# Patient Record
Sex: FEMALE | Race: WHITE | NOT HISPANIC OR LATINO
[De-identification: names, ages, dates, MRNs, and addresses within clinical notes are randomized per-mention and may not be internally consistent; named-entity substitution may affect disease eponyms.]

---

## 2023-03-06 PROBLEM — Z00.00 ENCOUNTER FOR PREVENTIVE HEALTH EXAMINATION: Status: ACTIVE | Noted: 2023-03-06

## 2023-03-08 ENCOUNTER — APPOINTMENT (OUTPATIENT)
Dept: BREAST CENTER | Facility: CLINIC | Age: 18
End: 2023-03-08
Payer: MEDICAID

## 2023-03-08 ENCOUNTER — NON-APPOINTMENT (OUTPATIENT)
Age: 18
End: 2023-03-08

## 2023-03-08 VITALS
HEART RATE: 73 BPM | HEIGHT: 66 IN | WEIGHT: 168 LBS | DIASTOLIC BLOOD PRESSURE: 74 MMHG | SYSTOLIC BLOOD PRESSURE: 118 MMHG | BODY MASS INDEX: 27 KG/M2

## 2023-03-08 DIAGNOSIS — Z87.898 PERSONAL HISTORY OF OTHER SPECIFIED CONDITIONS: ICD-10-CM

## 2023-03-08 DIAGNOSIS — Z78.9 OTHER SPECIFIED HEALTH STATUS: ICD-10-CM

## 2023-03-08 DIAGNOSIS — Z80.3 FAMILY HISTORY OF MALIGNANT NEOPLASM OF BREAST: ICD-10-CM

## 2023-03-08 DIAGNOSIS — L73.9 FOLLICULAR DISORDER, UNSPECIFIED: ICD-10-CM

## 2023-03-08 DIAGNOSIS — N60.81 OTHER BENIGN MAMMARY DYSPLASIAS OF RIGHT BREAST: ICD-10-CM

## 2023-03-08 PROCEDURE — 99243 OFF/OP CNSLTJ NEW/EST LOW 30: CPT

## 2023-03-08 NOTE — ASSESSMENT
[FreeTextEntry1] : 16 yo female with right breast resolved folliculitis and sebaceous cyst and folliculitis left breast\par recommend warm compresses prn\par recommend dermatology evaluation\par I do not recommend follow up imaging at this time\par She can see me prn as she is clinically improving, \par discussed skin hygiene and dermatology follow up\par \par She knows to call or return sooner should any concerns or questions arise.\par

## 2023-03-08 NOTE — PHYSICAL EXAM
[Normocephalic] : normocephalic [Atraumatic] : atraumatic [Supple] : supple [No Supraclavicular Adenopathy] : no supraclavicular adenopathy [Examined in the supine and seated position] : examined in the supine and seated position [Symmetrical] : symmetrical [No dominant masses] : no dominant masses in right breast  [No dominant masses] : no dominant masses left breast [No Nipple Retraction] : no left nipple retraction [No Nipple Discharge] : no left nipple discharge [No Axillary Lymphadenopathy] : no left axillary lymphadenopathy [No Edema] : no edema [No Rashes] : no rashes [No Ulceration] : no ulceration [de-identified] : there is no longer a palpable lesion LIQ, in the sternal edge of right breast 3:00 N13 there is a likely sebaceous cyst which is not infected [de-identified] : in area of patient concern 6:00 IMF there is a resolving folliculitis

## 2023-03-08 NOTE — HISTORY OF PRESENT ILLNESS
[FreeTextEntry1] : This is a 17 year old female referred by Dr. Astudillo for right breast lump felt 1 1/2 months ago on breast self exam. It is not associated with pain. Saw Dr. Ibis Astudillo who ordered a right breast US which did not reveal any abnormalities at 3:00 9 cm from nipple. At 3:00 13 cm from nipple there is a subcutaneous tubular hypoechoic lesion that could represent a thrombosed vein or other skin/subcutaneous lesion. \par She has a positive family history of breast cancer with her mother in her 40s.\par \par Denies any trauma, changes to herbs, diet or supplements. 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  She states the areas are less than before.\par \par She does SBE.\par She has noticed a change in her breast or a right breast lump.\par She has not noticed a change in her nipple or nipple area.\par She has not noticed a change in the skin of the breast.\par She is not experiencing nipple discharge.\par She is not experiencing breast pain.\par She has not noticed a lump or lymph node under the armpit.\par \par \par BREAST CANCER RISK FACTORS\par Menarche: 13\par Date of LMP: 2/11/2023\par Menopause: pre\par Grav:  0    Para: 0\par Age at first live birth: n/a\par Nursed: n/a\par Hysterectomy: N\par Oophorectomy: N\par OCP: for 1 year in the past\par HRT: N\par Last pap/pelvic exam: 2/8/2023 no pap was done\par Related family history: Mother Breast CA age 47\par Ashkenazi: N\par Mastery risk assessment:n/a\par BRCA testing: mother tested negative \par Bra size: 34DD\par \par Last mammogram:                              Location:\par Report reviewed.                                 Images reviewed.\par Results:\par \par \par Last ultrasound: (Limited, Right) 2/27/23    Location: [facility]\par Report reviewed.                                 Images reviewed. \par Results:Birads 3\par  At 3:00 in the right breast 9 cm from the nipple where the patient reports a palpable abnormality, there is no cyst, focal or solid mass or abnormal shadowing area. \par At 3:00 on the right chest wall 13 cm from the nipple, there is a subcutaneous tubular hypoechoic lesion with no internal color flow that could represent a thrombosed vein or other skin/subcutaneous lesion. Targeted US in 3 months to ensure stability or resolution. \par \par Last MRI:                                             Location:\par Report reviewed.\par

## 2023-03-08 NOTE — CONSULT LETTER
[Dear  ___] : Dear  [unfilled], [( Thank you for referring [unfilled] for consultation for _____ )] : Thank you for referring [unfilled] for consultation for [unfilled] [Please see my note below.] : Please see my note below. [Consult Closing:] : Thank you very much for allowing me to participate in the care of this patient.  If you have any questions, please do not hesitate to contact me. [Sincerely,] : Sincerely, [FreeTextEntry3] : Kae Lester MS DO\par Breast Surgeon\par [facility] \[place] [address]\par

## 2023-03-08 NOTE — REVIEW OF SYSTEMS
[Chest Pain] : chest pain [Constipation] : constipation [Heartburn] : heartburn [Skin Lesions] : skin lesion [Itching] : itching [Breast Lump] : breast lump [Breast Swelling] : breast swelling [Negative] : Musculoskeletal

## 2024-07-24 ENCOUNTER — NON-APPOINTMENT (OUTPATIENT)
Age: 19
End: 2024-07-24

## 2024-07-26 ENCOUNTER — APPOINTMENT (OUTPATIENT)
Dept: BREAST CENTER | Facility: CLINIC | Age: 19
End: 2024-07-26
Payer: MEDICAID

## 2024-07-26 VITALS
DIASTOLIC BLOOD PRESSURE: 80 MMHG | HEIGHT: 66 IN | SYSTOLIC BLOOD PRESSURE: 136 MMHG | WEIGHT: 180 LBS | BODY MASS INDEX: 28.93 KG/M2 | HEART RATE: 82 BPM

## 2024-07-26 DIAGNOSIS — Z80.3 FAMILY HISTORY OF MALIGNANT NEOPLASM OF BREAST: ICD-10-CM

## 2024-07-26 DIAGNOSIS — L73.9 FOLLICULAR DISORDER, UNSPECIFIED: ICD-10-CM

## 2024-07-26 PROCEDURE — 99213 OFFICE O/P EST LOW 20 MIN: CPT

## 2024-07-26 NOTE — HISTORY OF PRESENT ILLNESS
[FreeTextEntry1] : This is a 18 year old female referred by Dr. Astudillo for right breast lump felt 1 1/2 months ago on breast self exam. It is not associated with pain. Saw Dr. Ibis Astudillo who ordered a right breast US which did not reveal any abnormalities at 3:00 9 cm from nipple. At 3:00 13 cm from nipple there is a subcutaneous tubular hypoechoic lesion that could represent a thrombosed vein or other skin/subcutaneous lesion.  She has a positive family history of breast cancer with her mother in her 40s.  Denies any trauma, changes to herbs, diet or supplements.   She was at the [place] last weekend and noticed lumpiness along her breast and has bumps on other side and is sore and itchy.  She does SBE. She has noticed a change in her breast or a right breast lump. The one area opened and blood came out, she denies any pus, fever nor chills She has not noticed a change in her nipple or nipple area. She has noticed a change in the skin of the breast. She is not experiencing nipple discharge. She is experiencing breast pain. She has not noticed a lump or lymph node under the armpit.   BREAST CANCER RISK FACTORS Menarche: 13 Date of LMP: 7/17/2024 Menopause: pre Grav:  0    Para: 0 Age at first live birth: n/a Nursed: n/a Hysterectomy: N Oophorectomy: N OCP: for 1 year in the past, not currently HRT: N Last pap/pelvic exam: 2/8/2023 no pap was done Related family history: Mother Breast CA age 47 Ashkenazi: N Mastery risk assessment:n/a BRCA testing: mother tested negative  Bra size: 34DD  Last mammogram:                              Location: Report reviewed.                                 Images reviewed. Results:   Last ultrasound: (Limited, Right) 2/27/23    Location: [facility] Report reviewed.                                 Images reviewed.  Results:Birads 3  At 3:00 in the right breast 9 cm from the nipple where the patient reports a palpable abnormality, there is no cyst, focal or solid mass or abnormal shadowing area.  At 3:00 on the right chest wall 13 cm from the nipple, there is a subcutaneous tubular hypoechoic lesion with no internal color flow that could represent a thrombosed vein or other skin/subcutaneous lesion. Targeted US in 3 months to ensure stability or resolution.   Last MRI:                                             Location: Report reviewed.

## 2024-07-26 NOTE — PHYSICAL EXAM
[Normocephalic] : normocephalic [Atraumatic] : atraumatic [Supple] : supple [No Supraclavicular Adenopathy] : no supraclavicular adenopathy [Examined in the supine and seated position] : examined in the supine and seated position [Symmetrical] : symmetrical [No dominant masses] : no dominant masses in right breast  [No dominant masses] : no dominant masses left breast [No Nipple Retraction] : no left nipple retraction [No Nipple Discharge] : no left nipple discharge [No Axillary Lymphadenopathy] : no left axillary lymphadenopathy [No Edema] : no edema [No Rashes] : no rashes [No Ulceration] : no ulceration [de-identified] : along sternal border of her breast there is subcutaneous nodules going down to IMF [de-identified] : along left sternal edge and IMF there is also nodularity within the dermis

## 2024-07-26 NOTE — CONSULT LETTER
[Dear  ___] : Dear  [unfilled], [Courtesy Letter:] : I had the pleasure of seeing your patient, [unfilled], in my office today. [Please see my note below.] : Please see my note below. [Consult Closing:] : Thank you very much for allowing me to participate in the care of this patient.  If you have any questions, please do not hesitate to contact me. [Sincerely,] : Sincerely, [FreeTextEntry3] : Kae [place] [address] [DrQi  ___] : Dr. BANKS

## 2024-07-26 NOTE — ASSESSMENT
[FreeTextEntry1] : 17 yo female with skin lesions in chest recommend, again, dermatology evaluation she states no one takes her insurance my office is calling [facility] to facilitate evaluation as it is unclear whether this is infectious or hidradenitis rec keeping it dry open  she will let us know what dermatology recommends She knows to call or return sooner should any concerns or questions arise.

## 2024-09-23 ENCOUNTER — APPOINTMENT (OUTPATIENT)
Dept: BARIATRICS/WEIGHT MGMT | Facility: CLINIC | Age: 19
End: 2024-09-23
Payer: MEDICAID

## 2024-09-23 VITALS
DIASTOLIC BLOOD PRESSURE: 83 MMHG | HEART RATE: 84 BPM | WEIGHT: 182 LBS | BODY MASS INDEX: 30.32 KG/M2 | SYSTOLIC BLOOD PRESSURE: 126 MMHG | OXYGEN SATURATION: 97 % | HEIGHT: 65 IN

## 2024-09-23 DIAGNOSIS — E66.9 OBESITY, UNSPECIFIED: ICD-10-CM

## 2024-09-23 DIAGNOSIS — K30 FUNCTIONAL DYSPEPSIA: ICD-10-CM

## 2024-09-23 PROCEDURE — 99204 OFFICE O/P NEW MOD 45 MIN: CPT

## 2024-09-23 PROCEDURE — G2211 COMPLEX E/M VISIT ADD ON: CPT | Mod: NC

## 2024-09-24 PROBLEM — E66.9 OBESITY (BMI 30.0-34.9): Status: ACTIVE | Noted: 2024-09-23

## 2024-09-24 PROBLEM — K30 DELAYED GASTRIC EMPTYING: Status: ACTIVE | Noted: 2024-09-24

## 2024-09-24 RX ORDER — LINACLOTIDE 72 UG/1
72 CAPSULE, GELATIN COATED ORAL
Refills: 0 | Status: ACTIVE | COMMUNITY
Start: 2024-09-24

## 2024-09-24 NOTE — REVIEW OF SYSTEMS
[Negative] : Psychiatric [MED-ROS-Gastro-FT] : Acid reflux/nausea in the morning; very hungry at night

## 2024-09-24 NOTE — HISTORY OF PRESENT ILLNESS
[FreeTextEntry1] : Medical Hx: SOB (3 hospitalizations), Delayed gastric emptying (unsure of cause)  PCP, on waitlist for new provider.    Past Wt Loss Attempts: WW, IR, Slim fast  10 lbs weight gain yearly  Highest Adult Wt: 190 Lowest Adult Wt: 140-150  Dietary pattern: 2 meals daily B-skips B due to nausea  L-[employer] or left -overs  D-Salad, pasta (4pm)  8-9 pm-cold cuts/cheese and crackers after the gym  Coffee 2-3 per day  Dad does most the cooking No ETOH  Loss of control with eating: none  Do you wake up at night to eat: Water Intake: adequate    Exercise Regimen: 3-4 days per week; gym bike and Stairmaster; walks with down; has classes. Walks dog 30 min.  Sleep: 9-10 hrs Stress Level: managed  Gyn: regular  Social Hx: Lives with family  [place]: Fat% 36.1%, Muscle mass 110.8lbs, TBW% 40.5%, Bone Mass, 5.8 lbs, BMR 1,657

## 2024-09-25 ENCOUNTER — NON-APPOINTMENT (OUTPATIENT)
Age: 19
End: 2024-09-25

## 2024-09-27 LAB
ALBUMIN SERPL ELPH-MCNC: 4.9 G/DL
ALP BLD-CCNC: 68 U/L
ALT SERPL-CCNC: 16 U/L
ANION GAP SERPL CALC-SCNC: 16 MMOL/L
AST SERPL-CCNC: 21 U/L
BILIRUB DIRECT SERPL-MCNC: 0.1 MG/DL
BILIRUB INDIRECT SERPL-MCNC: 0.2 MG/DL
BILIRUB SERPL-MCNC: 0.3 MG/DL
BUN SERPL-MCNC: 10 MG/DL
CALCIUM SERPL-MCNC: 10 MG/DL
CHLORIDE SERPL-SCNC: 98 MMOL/L
CHOLEST SERPL-MCNC: 201 MG/DL
CO2 SERPL-SCNC: 22 MMOL/L
CREAT SERPL-MCNC: 0.72 MG/DL
EGFR: 122 ML/MIN/1.73M2
ESTIMATED AVERAGE GLUCOSE: 108 MG/DL
GLUCOSE SERPL-MCNC: 86 MG/DL
HBA1C MFR BLD HPLC: 5.4 %
HDLC SERPL-MCNC: 47 MG/DL
INSULIN SERPL-MCNC: 8 UU/ML
LDLC SERPL CALC-MCNC: 139 MG/DL
NONHDLC SERPL-MCNC: 154 MG/DL
POTASSIUM SERPL-SCNC: 4.5 MMOL/L
PROT SERPL-MCNC: 8 G/DL
SODIUM SERPL-SCNC: 136 MMOL/L
TRIGL SERPL-MCNC: 84 MG/DL
TSH SERPL-ACNC: 0.89 UIU/ML

## 2024-09-27 RX ORDER — PHENTERMINE HYDROCHLORIDE 15 MG/1
15 CAPSULE ORAL
Qty: 30 | Refills: 0 | Status: ACTIVE | COMMUNITY
Start: 2024-09-27 | End: 1900-01-01

## 2024-10-14 ENCOUNTER — APPOINTMENT (OUTPATIENT)
Dept: BARIATRICS/WEIGHT MGMT | Facility: CLINIC | Age: 19
End: 2024-10-14
Payer: MEDICAID

## 2024-10-14 VITALS
BODY MASS INDEX: 29.82 KG/M2 | HEART RATE: 77 BPM | DIASTOLIC BLOOD PRESSURE: 79 MMHG | SYSTOLIC BLOOD PRESSURE: 128 MMHG | WEIGHT: 179 LBS | HEIGHT: 65 IN | OXYGEN SATURATION: 98 %

## 2024-10-14 DIAGNOSIS — E66.811 OBESITY, CLASS 1: ICD-10-CM

## 2024-10-14 PROCEDURE — 99212 OFFICE O/P EST SF 10 MIN: CPT

## 2024-10-14 PROCEDURE — G2211 COMPLEX E/M VISIT ADD ON: CPT | Mod: NC

## 2024-11-11 ENCOUNTER — APPOINTMENT (OUTPATIENT)
Dept: BARIATRICS/WEIGHT MGMT | Facility: CLINIC | Age: 19
End: 2024-11-11
Payer: MEDICAID

## 2024-11-11 VITALS
HEART RATE: 76 BPM | DIASTOLIC BLOOD PRESSURE: 84 MMHG | HEIGHT: 65 IN | BODY MASS INDEX: 28.86 KG/M2 | WEIGHT: 173.25 LBS | SYSTOLIC BLOOD PRESSURE: 137 MMHG

## 2024-11-11 DIAGNOSIS — E66.811 OBESITY, CLASS 1: ICD-10-CM

## 2024-11-11 PROCEDURE — G2211 COMPLEX E/M VISIT ADD ON: CPT | Mod: NC

## 2024-11-11 PROCEDURE — 99213 OFFICE O/P EST LOW 20 MIN: CPT

## 2024-12-16 ENCOUNTER — APPOINTMENT (OUTPATIENT)
Dept: BARIATRICS/WEIGHT MGMT | Facility: CLINIC | Age: 19
End: 2024-12-16
Payer: MEDICAID

## 2024-12-16 VITALS
SYSTOLIC BLOOD PRESSURE: 129 MMHG | BODY MASS INDEX: 28.07 KG/M2 | OXYGEN SATURATION: 99 % | HEART RATE: 71 BPM | HEIGHT: 65 IN | WEIGHT: 168.5 LBS | DIASTOLIC BLOOD PRESSURE: 87 MMHG

## 2024-12-16 DIAGNOSIS — E66.811 OBESITY, CLASS 1: ICD-10-CM

## 2024-12-16 PROCEDURE — 99213 OFFICE O/P EST LOW 20 MIN: CPT
